# Patient Record
Sex: MALE | Race: WHITE
[De-identification: names, ages, dates, MRNs, and addresses within clinical notes are randomized per-mention and may not be internally consistent; named-entity substitution may affect disease eponyms.]

---

## 2018-07-28 ENCOUNTER — HOSPITAL ENCOUNTER (EMERGENCY)
Dept: HOSPITAL 25 - ED | Age: 38
Discharge: HOME | End: 2018-07-28
Payer: SELF-PAY

## 2018-07-28 VITALS — DIASTOLIC BLOOD PRESSURE: 87 MMHG | SYSTOLIC BLOOD PRESSURE: 158 MMHG

## 2018-07-28 DIAGNOSIS — F17.210: ICD-10-CM

## 2018-07-28 DIAGNOSIS — R53.83: ICD-10-CM

## 2018-07-28 DIAGNOSIS — F19.10: Primary | ICD-10-CM

## 2018-07-28 LAB
BASOPHILS # BLD AUTO: 0 10^3/UL (ref 0–0.2)
EOSINOPHIL # BLD AUTO: 0.2 10^3/UL (ref 0–0.6)
HCT VFR BLD AUTO: 42 % (ref 42–52)
HGB BLD-MCNC: 14.5 G/DL (ref 14–18)
INR PPP/BLD: 0.89 (ref 0.77–1.02)
LYMPHOCYTES # BLD AUTO: 1.8 10^3/UL (ref 1–4.8)
MCH RBC QN AUTO: 30 PG (ref 27–31)
MCHC RBC AUTO-ENTMCNC: 34 G/DL (ref 31–36)
MCV RBC AUTO: 86 FL (ref 80–94)
MONOCYTES # BLD AUTO: 0.8 10^3/UL (ref 0–0.8)
NEUTROPHILS # BLD AUTO: 9.1 10^3/UL (ref 1.5–7.7)
NRBC # BLD AUTO: 0 10^3/UL
NRBC BLD QL AUTO: 0
PLATELET # BLD AUTO: 185 10^3/UL (ref 150–450)
RBC # BLD AUTO: 4.9 10^6/UL (ref 4–5.4)
WBC # BLD AUTO: 12 10^3/UL (ref 3.5–10.8)

## 2018-07-28 PROCEDURE — 99283 EMERGENCY DEPT VISIT LOW MDM: CPT

## 2018-07-28 PROCEDURE — 85610 PROTHROMBIN TIME: CPT

## 2018-07-28 PROCEDURE — 93005 ELECTROCARDIOGRAM TRACING: CPT

## 2018-07-28 PROCEDURE — 96360 HYDRATION IV INFUSION INIT: CPT

## 2018-07-28 PROCEDURE — 36415 COLL VENOUS BLD VENIPUNCTURE: CPT

## 2018-07-28 PROCEDURE — 85025 COMPLETE CBC W/AUTO DIFF WBC: CPT

## 2018-07-28 PROCEDURE — 82550 ASSAY OF CK (CPK): CPT

## 2018-07-28 PROCEDURE — 84484 ASSAY OF TROPONIN QUANT: CPT

## 2018-07-28 PROCEDURE — 85730 THROMBOPLASTIN TIME PARTIAL: CPT

## 2018-07-28 PROCEDURE — 80053 COMPREHEN METABOLIC PANEL: CPT

## 2018-07-28 NOTE — ED
Substance Abuse/Use





- HPI Summary


HPI Summary: 





This is armida Villasenor documenting for attending Dr. Belkis MANE


This patient is a 38 year old M presenting to [facility] with a chief complaint 

of heroin OD since around 1130. Pt was given 8mg of nasal narcan at 1145. Prior 

to that pt was unresponsive with agonal breathing. Pt is alert an oriented upon 

arrival to ED. Pt endorses using heroin, but denies using other substances. Pt 

denies PMHx. Pt claims he was clean previously and relapsed today; his last 

detox was 1 month ago. Pt endorses fatigue, denies pain, SI, HI.





- History Of Current Complaint


Chief Complaint: EDSubstanceAbuse


Stated Complaint: OVERDOSE


Time Seen by Provider: 07/28/18 12:16


Hx Obtained From: Patient, EMS


Onset/Duration  of Drug/ETOH Abuse: Hours


Ingestion History: Type/Name Of Drug - heroin, Amount Ingested - 1 bag, 

Approximate Time Of Ingestion - 1130


Overdose Characteristics: IV


Timing Of Abuse: Intermittent, Recent Cessation For A Period Of - 1 month prior 

to today


Severity Initially: Severe


Severity Currently: Mild


Character: Lethargic


Aggravating Factor(s): Therapy Non-compliance - rehab at [facility]


Alleviating Factor(s): Nothing


Associated Signs And Symptoms: Negative


Related Hx: Drug/Alcohol Last Used @ - 1130, Possible Multi Drug Ingestion, 

Prior Drug Abuse Counseling/Admission





- Allergies/Home Medications


Allergies/Adverse Reactions: 


 Allergies











Allergy/AdvReac Type Severity Reaction Status Date / Time


 


No Known Allergies Allergy   Verified 07/28/18 12:18











Home Medications: 


 Home Medications





NK [No Home Medications Reported]  07/28/18 [History Confirmed 07/28/18]











PMH/Surg Hx/FS Hx/Imm Hx


Endocrine/Hematology History: 


   Denies: Hx Sickle Cell Disease


Cardiovascular History: 


   Denies: Hx Myocardial Infarction


Respiratory History: 


   Denies: Hx Lung Cancer


 History: 


   Denies: Hx Acute Renal Failure


Musculoskeletal History: 


   Denies: Hx Rheumatoid Arthritis


Sensory History: 


   Denies: Hx Legally Blind, Hx Deafness


Opthamlomology History: 


   Denies: Hx Legally Blind


EENT History: 


   Denies: Hx Deafness


Neurological History: 


   Denies: Hx CVA


Psychiatric History: Reports: Hx Substance Abuse


Infectious Disease History: Yes


Infectious Disease History: 


   Denies: Traveled Outside the [place] in Last 30 Days





- Family History


Known Family History: 


   Negative: Blood Disorder





- Social History


Alcohol Use: None


Hx Substance Use: Yes


Substance Use Type: Reports: Heroin


Hx Tobacco Use: Yes


Smoking Status (MU): Current Every Day Smoker





Review of Systems


Positive: Fatigue.  Negative: Fever


Positive: no symptoms reported


Negative: Other - Si, HI


All Other Systems Reviewed And Are Negative: Yes





Physical Exam





- Summary


Physical Exam Summary: 





Appearance: Well appearing, no pain distress, drowsy


Skin: warm, dry, reflects adequate perfusion


Head/face: normal


Eyes: EOMI, WILDA


ENT: normal


Neck: supple, non-tender


Respiratory: CTA, breath sounds present


Cardiovascular: RRR, pulses symmetrical 


Abdomen: non-tender, soft


Bowel: present


Musculoskeletal: normal, strength/ROM intact


Neuro: normal, sensory motor intact, A&Ox3


Triage Information Reviewed: Yes


Vital Signs On Initial Exam: 


 Initial Vitals











Temp Pulse Resp BP Pulse Ox


 


 98.4 F   89   15   136/90   98 


 


 07/28/18 12:19  07/28/18 12:19  07/28/18 12:19  07/28/18 12:19  07/28/18 12:19











Vital Signs Reviewed: Yes





Diagnostics





- Vital Signs


 Vital Signs











  Temp Pulse Resp BP Pulse Ox


 


 07/28/18 12:19  98.4 F  89  15  136/90  98














- Laboratory


Result Diagrams: 


 07/28/18 12:38





 07/28/18 12:38


Lab Statement: Any lab studies that have been ordered have been reviewed, and 

results considered in the medical decision making process.





- EKG


  ** 1219


Cardiac Rate: NL - 87


EKG Rhythm: Sinus Rhythm


ST Segment: Normal


Ectopy: None


EKG Interpretation: No acute changes.





Re-Evaluation





- Re-Evaluation


  ** First Eval


Re-Evaluation Time: 15:10


Change: Improved


Comment: Pt wants to leave, alert and oriented, walking around.





Course/Dx





- Course


Course Of Treatment: A 38-year-old M presents to the ED with a CC of heroin OD 

at 1130. (+) fatigue. (-) SI, HI, pain, . IV heroin administration, was in 

recovery for "1 month" PTA.  An EKG reveals normal sinus rythym of 87 BPM, no 

acute changes. In the ED course, pt was given .





- Diagnoses


Differential Diagnosis/HQI/PQRI: Positive: Alcohol Abuse, Anxiety, Drug Abuse


Provider Diagnoses: 


 Substance abuse








Discharge





- Sign-Out/Discharge


Documenting (check all that apply): Patient Departure - discharge





- Discharge Plan


Condition: Stable


Disposition: HOME


Patient Education Materials:  Polysubstance Abuse (ED)


[facility] 


[facility] - 3 Days


Additional Instructions: 


Return to the emergency department for new or worsening symptoms.





- Billing Disposition and Condition


Condition: STABLE


Disposition: Home